# Patient Record
Sex: FEMALE | Race: WHITE | Employment: FULL TIME | URBAN - METROPOLITAN AREA
[De-identification: names, ages, dates, MRNs, and addresses within clinical notes are randomized per-mention and may not be internally consistent; named-entity substitution may affect disease eponyms.]

---

## 2017-11-20 ENCOUNTER — COMMUNICATION - HEALTHEAST (OUTPATIENT)
Dept: SCHEDULING | Facility: CLINIC | Age: 22
End: 2017-11-20

## 2017-11-20 DIAGNOSIS — F33.9 MAJOR DEPRESSIVE DISORDER, RECURRENT EPISODE (H): ICD-10-CM

## 2018-02-19 ENCOUNTER — OFFICE VISIT - HEALTHEAST (OUTPATIENT)
Dept: INTERNAL MEDICINE | Facility: CLINIC | Age: 23
End: 2018-02-19

## 2018-02-19 ENCOUNTER — COMMUNICATION - HEALTHEAST (OUTPATIENT)
Dept: INTERNAL MEDICINE | Facility: CLINIC | Age: 23
End: 2018-02-19

## 2018-02-19 DIAGNOSIS — J03.90 TONSILLITIS WITH EXUDATE: ICD-10-CM

## 2018-02-19 LAB
DEPRECATED S PYO AG THROAT QL EIA: NORMAL
MONOCYTES NFR BLD AUTO: POSITIVE %

## 2018-02-20 ENCOUNTER — COMMUNICATION - HEALTHEAST (OUTPATIENT)
Dept: INTERNAL MEDICINE | Facility: CLINIC | Age: 23
End: 2018-02-20

## 2018-02-20 LAB — GROUP A STREP BY PCR: NORMAL

## 2018-02-22 ENCOUNTER — COMMUNICATION - HEALTHEAST (OUTPATIENT)
Dept: INTERNAL MEDICINE | Facility: CLINIC | Age: 23
End: 2018-02-22

## 2018-02-28 ENCOUNTER — COMMUNICATION - HEALTHEAST (OUTPATIENT)
Dept: SCHEDULING | Facility: CLINIC | Age: 23
End: 2018-02-28

## 2018-02-28 ENCOUNTER — OFFICE VISIT - HEALTHEAST (OUTPATIENT)
Dept: INTERNAL MEDICINE | Facility: CLINIC | Age: 23
End: 2018-02-28

## 2018-02-28 DIAGNOSIS — B27.90 INFECTIOUS MONONUCLEOSIS: ICD-10-CM

## 2018-02-28 ASSESSMENT — MIFFLIN-ST. JEOR: SCORE: 1203.09

## 2018-05-24 ENCOUNTER — COMMUNICATION - HEALTHEAST (OUTPATIENT)
Dept: INTERNAL MEDICINE | Facility: CLINIC | Age: 23
End: 2018-05-24

## 2018-05-24 DIAGNOSIS — F33.9 MAJOR DEPRESSIVE DISORDER, RECURRENT EPISODE (H): ICD-10-CM

## 2018-06-02 ENCOUNTER — COMMUNICATION - HEALTHEAST (OUTPATIENT)
Dept: SCHEDULING | Facility: CLINIC | Age: 23
End: 2018-06-02

## 2018-06-05 ENCOUNTER — COMMUNICATION - HEALTHEAST (OUTPATIENT)
Dept: SCHEDULING | Facility: CLINIC | Age: 23
End: 2018-06-05

## 2018-06-15 ENCOUNTER — OFFICE VISIT - HEALTHEAST (OUTPATIENT)
Dept: INTERNAL MEDICINE | Facility: CLINIC | Age: 23
End: 2018-06-15

## 2018-06-15 DIAGNOSIS — A09 TRAVELER'S DIARRHEA: ICD-10-CM

## 2018-06-15 DIAGNOSIS — Z29.89 NEED FOR MALARIA PROPHYLAXIS: ICD-10-CM

## 2018-06-15 DIAGNOSIS — Z23: ICD-10-CM

## 2018-06-26 ENCOUNTER — COMMUNICATION - HEALTHEAST (OUTPATIENT)
Dept: INTERNAL MEDICINE | Facility: CLINIC | Age: 23
End: 2018-06-26

## 2018-07-15 ENCOUNTER — COMMUNICATION - HEALTHEAST (OUTPATIENT)
Dept: INTERNAL MEDICINE | Facility: CLINIC | Age: 23
End: 2018-07-15

## 2018-07-16 ENCOUNTER — OFFICE VISIT - HEALTHEAST (OUTPATIENT)
Dept: INTERNAL MEDICINE | Facility: CLINIC | Age: 23
End: 2018-07-16

## 2018-07-16 ENCOUNTER — COMMUNICATION - HEALTHEAST (OUTPATIENT)
Dept: SCHEDULING | Facility: CLINIC | Age: 23
End: 2018-07-16

## 2018-07-16 ENCOUNTER — AMBULATORY - HEALTHEAST (OUTPATIENT)
Dept: INTERNAL MEDICINE | Facility: CLINIC | Age: 23
End: 2018-07-16

## 2018-07-16 DIAGNOSIS — R35.0 URINE FREQUENCY: ICD-10-CM

## 2018-07-16 DIAGNOSIS — N39.0 FREQUENT UTI: ICD-10-CM

## 2018-07-16 LAB
ALBUMIN UR-MCNC: NEGATIVE MG/DL
APPEARANCE UR: CLEAR
BACTERIA #/AREA URNS HPF: ABNORMAL HPF
BILIRUB UR QL STRIP: NEGATIVE
COLOR UR AUTO: YELLOW
GLUCOSE UR STRIP-MCNC: NEGATIVE MG/DL
HGB UR QL STRIP: ABNORMAL
KETONES UR STRIP-MCNC: NEGATIVE MG/DL
LEUKOCYTE ESTERASE UR QL STRIP: ABNORMAL
NITRATE UR QL: NEGATIVE
PH UR STRIP: 6 [PH] (ref 5–8)
RBC #/AREA URNS AUTO: ABNORMAL HPF
SP GR UR STRIP: <=1.005 (ref 1–1.03)
SQUAMOUS #/AREA URNS AUTO: ABNORMAL LPF
UROBILINOGEN UR STRIP-ACNC: ABNORMAL
WBC #/AREA URNS AUTO: ABNORMAL HPF

## 2018-07-18 ENCOUNTER — COMMUNICATION - HEALTHEAST (OUTPATIENT)
Dept: INTERNAL MEDICINE | Facility: CLINIC | Age: 23
End: 2018-07-18

## 2018-07-18 DIAGNOSIS — N30.00 ACUTE CYSTITIS WITHOUT HEMATURIA: ICD-10-CM

## 2018-07-18 LAB — BACTERIA SPEC CULT: ABNORMAL

## 2018-07-26 ENCOUNTER — COMMUNICATION - HEALTHEAST (OUTPATIENT)
Dept: INTERNAL MEDICINE | Facility: CLINIC | Age: 23
End: 2018-07-26

## 2018-08-24 ENCOUNTER — COMMUNICATION - HEALTHEAST (OUTPATIENT)
Dept: INTERNAL MEDICINE | Facility: CLINIC | Age: 23
End: 2018-08-24

## 2018-08-24 ENCOUNTER — COMMUNICATION - HEALTHEAST (OUTPATIENT)
Dept: SCHEDULING | Facility: CLINIC | Age: 23
End: 2018-08-24

## 2018-08-24 DIAGNOSIS — N30.00 ACUTE CYSTITIS WITHOUT HEMATURIA: ICD-10-CM

## 2018-11-16 ENCOUNTER — COMMUNICATION - HEALTHEAST (OUTPATIENT)
Dept: SCHEDULING | Facility: CLINIC | Age: 23
End: 2018-11-16

## 2018-12-11 ENCOUNTER — COMMUNICATION - HEALTHEAST (OUTPATIENT)
Dept: INTERNAL MEDICINE | Facility: CLINIC | Age: 23
End: 2018-12-11

## 2018-12-11 ENCOUNTER — OFFICE VISIT - HEALTHEAST (OUTPATIENT)
Dept: INTERNAL MEDICINE | Facility: CLINIC | Age: 23
End: 2018-12-11

## 2018-12-11 DIAGNOSIS — N39.0 URINARY TRACT INFECTION WITHOUT HEMATURIA, SITE UNSPECIFIED: ICD-10-CM

## 2018-12-11 LAB
ALBUMIN UR-MCNC: NEGATIVE MG/DL
APPEARANCE UR: CLEAR
BACTERIA #/AREA URNS HPF: ABNORMAL HPF
BILIRUB UR QL STRIP: NEGATIVE
COLOR UR AUTO: YELLOW
GLUCOSE UR STRIP-MCNC: NEGATIVE MG/DL
HGB UR QL STRIP: ABNORMAL
KETONES UR STRIP-MCNC: NEGATIVE MG/DL
LEUKOCYTE ESTERASE UR QL STRIP: ABNORMAL
NITRATE UR QL: NEGATIVE
PH UR STRIP: 6 [PH] (ref 5–8)
RBC #/AREA URNS AUTO: ABNORMAL HPF
SP GR UR STRIP: 1.01 (ref 1–1.03)
SQUAMOUS #/AREA URNS AUTO: ABNORMAL LPF
UROBILINOGEN UR STRIP-ACNC: ABNORMAL
WBC #/AREA URNS AUTO: ABNORMAL HPF

## 2018-12-12 ENCOUNTER — COMMUNICATION - HEALTHEAST (OUTPATIENT)
Dept: INTERNAL MEDICINE | Facility: CLINIC | Age: 23
End: 2018-12-12

## 2018-12-12 DIAGNOSIS — N39.0 RECURRENT UTI: ICD-10-CM

## 2018-12-13 ENCOUNTER — COMMUNICATION - HEALTHEAST (OUTPATIENT)
Dept: INTERNAL MEDICINE | Facility: CLINIC | Age: 23
End: 2018-12-13

## 2018-12-13 ENCOUNTER — AMBULATORY - HEALTHEAST (OUTPATIENT)
Dept: INTERNAL MEDICINE | Facility: CLINIC | Age: 23
End: 2018-12-13

## 2018-12-13 DIAGNOSIS — N39.0 URINARY TRACT INFECTION: ICD-10-CM

## 2018-12-13 LAB — BACTERIA SPEC CULT: ABNORMAL

## 2018-12-19 ENCOUNTER — OFFICE VISIT (OUTPATIENT)
Dept: UROLOGY | Facility: CLINIC | Age: 23
End: 2018-12-19
Attending: OBSTETRICS & GYNECOLOGY
Payer: COMMERCIAL

## 2018-12-19 VITALS
WEIGHT: 111 LBS | HEART RATE: 66 BPM | SYSTOLIC BLOOD PRESSURE: 113 MMHG | HEIGHT: 63 IN | DIASTOLIC BLOOD PRESSURE: 74 MMHG | BODY MASS INDEX: 19.67 KG/M2

## 2018-12-19 DIAGNOSIS — N30.00 ACUTE CYSTITIS WITHOUT HEMATURIA: Primary | ICD-10-CM

## 2018-12-19 DIAGNOSIS — N89.8 VAGINAL DISCHARGE: ICD-10-CM

## 2018-12-19 DIAGNOSIS — N39.0 RECURRENT UTI: ICD-10-CM

## 2018-12-19 LAB
CLUE CELLS: NORMAL
TRICHOMONAS (WET PREP): NORMAL
YEAST (WET PREP): NORMAL

## 2018-12-19 PROCEDURE — 87210 SMEAR WET MOUNT SALINE/INK: CPT | Mod: ZF | Performed by: OBSTETRICS & GYNECOLOGY

## 2018-12-19 PROCEDURE — G0463 HOSPITAL OUTPT CLINIC VISIT: HCPCS | Mod: ZF

## 2018-12-19 RX ORDER — CEPHALEXIN 500 MG/1
500 CAPSULE ORAL PRN
COMMUNITY
Start: 2018-12-14 | End: 2019-04-03

## 2018-12-19 RX ORDER — CIPROFLOXACIN 500 MG/1
500 TABLET, FILM COATED ORAL 2 TIMES DAILY
COMMUNITY
Start: 2018-12-13 | End: 2019-04-03

## 2018-12-19 RX ORDER — NORGESTIMATE AND ETHINYL ESTRADIOL 0.25-0.035
1 KIT ORAL DAILY
COMMUNITY
Start: 2018-11-09 | End: 2023-08-09

## 2018-12-19 RX ORDER — TRETINOIN 1 MG/G
1 CREAM TOPICAL PRN
COMMUNITY

## 2018-12-19 RX ORDER — NITROFURANTOIN 25; 75 MG/1; MG/1
100 CAPSULE ORAL DAILY
Qty: 90 CAPSULE | Refills: 0 | Status: SHIPPED | OUTPATIENT
Start: 2018-12-19 | End: 2019-04-03

## 2018-12-19 SDOH — HEALTH STABILITY: MENTAL HEALTH: HOW MANY STANDARD DRINKS CONTAINING ALCOHOL DO YOU HAVE ON A TYPICAL DAY?: 1 OR 2

## 2018-12-19 SDOH — HEALTH STABILITY: MENTAL HEALTH: HOW OFTEN DO YOU HAVE 6 OR MORE DRINKS ON ONE OCCASION?: NEVER

## 2018-12-19 SDOH — HEALTH STABILITY: MENTAL HEALTH: HOW OFTEN DO YOU HAVE A DRINK CONTAINING ALCOHOL?: 2-3 TIMES A WEEK

## 2018-12-19 ASSESSMENT — PAIN SCALES - GENERAL: PAINLEVEL: NO PAIN (0)

## 2018-12-19 ASSESSMENT — MIFFLIN-ST. JEOR: SCORE: 1209.68

## 2018-12-19 NOTE — PROGRESS NOTES
December 19, 2018    Referring Provider: Referred Self, MD  No address on file    Primary Care Provider: No primary care provider on file.    CC: Recurrent UTIs    HPI:  Nereyda Carr is a 23 year old female who presents for evaluation of her pelvic floor symptoms.  She has at least 3 culture proven UTIs since July of this year. In the past she would get 1-2 UTIs/year since initiating intercourse. Starting in July she would get a UTI every time she had intercourse with her new partner. Denies anal intercourse. They both try to clean before and after intercourse. Does use a vibrator, but is careful to clean that as well. Practices good hygiene after voiding. W/U to date includes a CT abd/pelvis in July that was normal without any evidence of renal anomalies. Has not had cystoscopy to date. Is currently on course of Cipro that will end tomorrow for her most current UTI. Was given Keflex to take as prophylaxis after intercourse.    12/11/18  Culture ESCHERICHIA COLI (A)   [facility]     Culture, Urine (12/11/2018 2:08 PM CST)   Specimen   Urine     Culture, Urine (12/11/2018 2:08 PM CST)   Organism Antibiotic Method Susceptibility   Escherichia coli Amoxicillin + Clavulanate PARAMJIT 16/8: Intermediate     Escherichia coli Ampicillin PARAMJIT >16: Resistant     Escherichia coli Cefazolin PARAMJIT >16: Resistant     Escherichia coli Cefepime PARAMJIT <=1: Sensitive     Escherichia coli Ceftriaxone PARAMJIT <=1: Sensitive     Escherichia coli Ciprofloxacin PARAMJIT <=0.5: Sensitive     Escherichia coli Gentamicin PARAMJIT <=2: Sensitive     Escherichia coli Levofloxacin PARAMJIT <=1: Sensitive     Escherichia coli Meropenem PARAMJIT <=0.25: Sensitive     Escherichia coli Nitrofurantoin PARAMJIT <=16: Sensitive     Escherichia coli Tetracycline PARAMJIT <=2: Sensitive     Escherichia coli Tobramycin PARAMJIT <=2: Sensitive     Escherichia coli Trimethoprim + Sulfamethoxazole PARAMJIT >2/38: Resistant       11/19/18  Urine Cuture > 100,000 col/ml Escherichia coliAbnormal    Urine Culture Escherichia coliAbnormal   Organism Comment > 100,000 col/mlAbnormal   Resulting Agency PN SOFT     Susceptibility     Escherichia coli   PARAMJIT SENSITIVITY   Amikacin <=2 mcg/mL Sensitive   Ampicillin/Sulbactam 16 mcg/mL Intermediate   Cefazolin <=4 mcg/mL Sensitive1   Ciprofloxacin >=4 mcg/mL Resistant   Ertapenem <=0.5 mcg/mL Sensitive   Imipenem <=0.25 mcg/mL Sensitive   Levofloxacin >=8 mcg/mL Resistant   Meropenem <=0.25 mcg/mL Sensitive   Nitrofurantoin <=16 mcg/mL Sensitive2   PIPERACILLIN/TAZOBACTAM <=4 mcg/mL Sensitive   Tobramycin 2 mcg/mL Sensitive   Trimethoprim/Sulfamethoxazole >=320 mcg/mL Resistant     Culture, Urine (09/16/2018 7:27 AM CDT)  Culture, Urine (09/16/2018 7:27 AM CDT)   Component Value Ref Range Performed At   Culture ESCHERICHIA COLI (A)   [facility]   Culture GROUP B STREPTOCOCCUS (A)   [facility]     Culture, Urine (09/16/2018 7:27 AM CDT)   Specimen   Urine     Culture, Urine (09/16/2018 7:27 AM CDT)   Organism Antibiotic Method Susceptibility   Escherichia coli Amoxicillin + Clavulanate PARAMJIT <=4/2: Sensitive     Escherichia coli Ampicillin PARAMJIT <=4: Sensitive     Escherichia coli Cefazolin PARAMJIT 2: Sensitive     Escherichia coli Cefepime PARAMJIT <=1: Sensitive     Escherichia coli Ceftriaxone PARAMJIT <=1: Sensitive     Escherichia coli Ciprofloxacin PARAMJIT <=0.5: Sensitive     Escherichia coli Gentamicin PARAMJIT <=2: Sensitive     Escherichia coli Levofloxacin PARAMJIT <=1: Sensitive     Escherichia coli Meropenem PARAMJIT <=0.25: Sensitive     Escherichia coli Nitrofurantoin PARAMJIT <=16: Sensitive     Escherichia coli Tetracycline PARAMJIT <=2: Sensitive     Escherichia coli Tobramycin PARAMJIT <=2: Sensitive     Escherichia coli Trimethoprim + Sulfamethoxazole PARAMJIT <=0.5: Sensitive       Culture, Urine (07/16/2018 9:14 AM CDT)  Culture, Urine (07/16/2018 9:14 AM CDT)   Component Value Ref Range Performed At   Culture ESCHERICHIA COLI (A)   [facility]     Culture, Urine  (07/16/2018 9:14 AM CDT)   Specimen   Urine     Culture, Urine (07/16/2018 9:14 AM CDT)   Organism Antibiotic Method Susceptibility   Escherichia coli Amoxicillin + Clavulanate PARAMJIT <=4/2: Sensitive     Escherichia coli Ampicillin PARAMJIT <=4: Sensitive     Escherichia coli Cefazolin PARAMJIT <=1: Sensitive     Escherichia coli Cefepime PARAMJIT <=1: Sensitive     Escherichia coli Ceftriaxone PARAMJIT <=1: Sensitive     Escherichia coli Ciprofloxacin PARAMJIT >2: Resistant     Escherichia coli Gentamicin PARAMJIT <=2: Sensitive     Escherichia coli Levofloxacin PARAMJIT >4: Resistant     Escherichia coli Meropenem PARAMJIT <=0.25: Sensitive     Escherichia coli Nitrofurantoin PARAMJIT <=16: Sensitive     Escherichia coli Tetracycline PARAMJIT >8: Resistant     Escherichia coli Tobramycin PARAMJIT <=2: Sensitive     Escherichia coli Trimethoprim + Sulfamethoxazole PARAMJIT >2/38: Resistant         EXAM DATE:         07/26/2018    [facility]  CT ABDOMEN, PELVIS W/O and WITH FOR HEMATURIA  7/26/2018 8:00 AM    INDICATION: FREQUENCY OF MICTURITION URINARY TRACT INFECTION, SITE NOT SPECIFIED  TECHNIQUE: CT abdomen without contrast and CT abdomen and pelvis with IV  contrast. CT urogram biphasic protocol with a pyelonephrographic phase.  Multiplanar reformation images (MPR). Dose reduction techniques were used.  IV CONTRAST: 100 mL [id number] was administered from a single use vial.  COMPARISON: None.    FINDINGS:  LUNG BASES: Negative.    ABDOMEN:    Kidneys are symmetric in size, position, and axis. Symmetric renal enhancement.  No renal calculi. No hydronephrosis. No filling defects in the visualized  intrarenal collecting systems or ureters. The distal half of the left ureter and  the proximal third of the right ureter are not opacified.    Liver and spleen are unremarkable. Gallbladder, pancreas, and adrenal glands are  within normal limits.    Stomach and small bowel are unremarkable. No obstruction. Moderate stool burden  in the colon.    No bulky  abdominopelvic lymphadenopathy. There is trace fluid in the cul-de-sac.  No free air.    No abdominal aortic aneurysm. Early branching of the right renal artery. Small  accessory left renal artery to the lower pole.    PELVIS: There is a right ureteral jet. Urinary bladder grossly unremarkable.  Uterus and ovaries grossly within normal limits by CT.    MUSCULOSKELETAL: Small bone island in the right ilium.    CONCLUSION:  1.  Negative for acute pyelonephritis.    2.  No urothelial abnormalities in the visualized upper tracts.    3.  Negative for renal calculi or hydronephrosis    Prolapse:  Do you feel a vaginal bulge? no                                      Pressure? no   Do you have to place your fingers in the vagina or in the rectum to have a bowel movement? no  Impact to quality of life? NA     Stress Incontinence:  Do you leak urine with cough, sneeze, exercise? yes  How often do you leak with cough, sneeze, exercise?  occ  How much do you usually leak? drop   Do you wear a pad? no If so; NA  Impact to quality of life? minimal    Urge Incontinence:  Do you often get sudden urges to urinate? no  How often do have urges? NA  If so, do you leak with these urges? NA  How much do you usually leak? NA  Impact to quality of life? NA    Voids/day:4  Nocturia: 0  Fluid intake: 6  Caffeine: 1    Urinating:  Difficulty starting urination or strain to void? no  Weak or intermittent stream? no  Incomplete emptying or dribbling? no  Pain or burning with urination? no  Any blood in your urine? no    GI:  Constipation? yes  Frequency stools 4-5/week    Straining for stools yes  Stool consistency hard     Ever leak stool (Accidental Bowel Leakage)? no      If so, how often?               NA      If so, do you leak?                   NA      Soiling without sensation? no  History of irritable bowel or Crohn's? no    Sexual/Pain:  Are you currently having sex?. yes  Pain with sex?   no   Sexual Partner: male  Do any of these  symptoms interfere with sex? no  Impact to quality of life? NA    Prior therapy:  Ever done pelvic floor physical therapy? no  Trial of medication? no  Have you ever tried a pessary? no    Medical History:  Do you have?   High Cholesterol? no     Diabetes? no  High Blood pressure? no     Recurrent UTIs? yes  Sleep Apnea? no  Other medical problems: acne    Surgical History:    Hysterectomy? no   Bladder Surgery? no   Other? no     OB/Gyn History:  Pregnancies? 0  Deliveries? 0  Vaginal 0  Section 0  Current birth control? OCPs  Periods? regular  When was the first day of your last period? 16  Last Pap smear? UTD Any abnormal? no  Last mammogram? NA  Last colonoscopy? NA    Medications/Vitamins/Supplements: reviewed    Drug Allergies: c/o flu-like symptoms if takes macrodantin. Only time she took macrodantin was when she had episode of pyleonephritis    Latex Allergy: no  Iodine Allergy no    Family History: (list relationship and age at diagnosis)  Breast cancer? no   Ovarian cancer? no   Colon cancer? no  Other? no    Social History:  Marital status: single  Do you/ have you ever smoke(d)  cigarettes? no  Drink more than 1 alcoholic beverage a day?  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 no                   Unusual headaches? no  Leg pain w/ walking/exercise? no                       Easy bruising? no  Difficulty breathing w/ walking/exercise? no  Problems with vision? no  Dizziness, falls, or fainting? no  Excessive bleeding from cuts, gums, surgery? no  Other: no    No past medical history on file.    No past surgical history on file.    Social History     Socioeconomic History     Marital status: Single     Spouse name: Not on file     Number of children: Not on file     Years of education: Not on file     Highest education level: Not on file   Social Needs     Financial resource strain: Not on file     Food  insecurity - worry: Not on file     Food insecurity - inability: Not on file     Transportation needs - medical: Not on file     Transportation needs - non-medical: Not on file   Occupational History     Not on file   Tobacco Use     Smoking status: Not on file   Substance and Sexual Activity     Alcohol use: Not on file     Drug use: Not on file     Sexual activity: Not on file   Other Topics Concern     Not on file   Social History Narrative     Not on file       No family history on file.    ROS    Allergies not on file    No current outpatient medications on file.     No current facility-administered medications for this visit.        There were no vitals taken for this visit. No LMP recorded. There is no height or weight on file to calculate BMI.  Ms. Carr is alert, comfortable in no acute distress, non-labored breathing.   Abdomen is soft, non-tender, non-distended, no CVAT.    Normal external female genitalia. The urethra was normal appearing w/o masses, NT.    She has good support on supine strain.  Speculum and bimanual exam are remarkable for normal appearing vagina with moderate amount whitish discharge Uterus and adnexa without masses, NT.      3/5 kegels.      A/P: Nereyda Carr is a 23 year old F with recurrent E coli UTIs    W/U to date is negative. UTIs seem to be associated with intercourse. Will schedule cystoscopy to r/o possible bladder anomaly. Dicussed options of prophylaxis after intercourse versus continuous prophylaxis with macrodantin. Doubt that she has true allergy to macrodantin as her symptoms were probably secondary to her kidney infection. After discussion, shew would like to try continuous prophylaxis for 3-6 months. F/U in 3 months or sooner if breaks through antibiotics.     A total of 60 minutes were spent with the patient today, > 50% in counseling and coordination of care    Marcello Morse MD  Professor, OB/GYN  Urogynecologist  CC  No care team member to display  SELF,  REFERRED

## 2018-12-19 NOTE — LETTER
Date:December 20, 2018      Patient was self referred, no letter generated. Do not send.        [facility] Information

## 2018-12-19 NOTE — PATIENT INSTRUCTIONS
Schedule cystoscopy.  Macrobid 1 po every day #90 X1 RF  F/U in 3 months or sooner if gets UTI while on prophylaxis

## 2018-12-19 NOTE — LETTER
12/19/2018       RE: Nereyda Carr  [address] [facility] [address]     Dear Colleague,    Thank you for referring your patient, Nereyda Carr, to the [facility] at [facility]. Please see a copy of my visit note below.    December 19, 2018    Referring Provider: Referred Self, MD  No address on file    Primary Care Provider: No primary care provider on file.    CC: Recurrent UTIs    HPI:  Nereyda Carr is a 23 year old female who presents for evaluation of her pelvic floor symptoms.  She has at least 3 culture proven UTIs since July of this year. In the past she would get 1-2 UTIs/year since initiating intercourse. Starting in July she would get a UTI every time she had intercourse with her new partner. Denies anal intercourse. They both try to clean before and after intercourse. Does use a vibrator, but is careful to clean that as well. Practices good hygiene after voiding. W/U to date includes a CT abd/pelvis in July that was normal without any evidence of renal anomalies. Has not had cystoscopy to date. Is currently on course of Cipro that will end tomorrow for her most current UTI. Was given Keflex to take as prophylaxis after intercourse.    12/11/18  Culture ESCHERICHIA COLI (A)   [facility] LABORATORY     Culture, Urine (12/11/2018 2:08 PM CST)   Specimen   Urine     Culture, Urine (12/11/2018 2:08 PM CST)   Organism Antibiotic Method Susceptibility   Escherichia coli Amoxicillin + Clavulanate PARAMJIT 16/8: Intermediate     Escherichia coli Ampicillin PARAMJIT >16: Resistant     Escherichia coli Cefazolin PARAMJIT >16: Resistant     Escherichia coli Cefepime PARAMJIT <=1: Sensitive     Escherichia coli Ceftriaxone PARAMJIT <=1: Sensitive     Escherichia coli Ciprofloxacin PARAMJIT <=0.5: Sensitive     Escherichia coli Gentamicin PARAMJIT <=2: Sensitive     Escherichia coli Levofloxacin PARAMJIT <=1: Sensitive     Escherichia coli Meropenem PARAMJIT <=0.25: Sensitive     Escherichia  coli Nitrofurantoin PARAMJIT <=16: Sensitive     Escherichia coli Tetracycline PARAMJIT <=2: Sensitive     Escherichia coli Tobramycin PARAMJIT <=2: Sensitive     Escherichia coli Trimethoprim + Sulfamethoxazole PARAMJIT >2/38: Resistant       11/19/18  Urine Cuture > 100,000 col/ml Escherichia coliAbnormal   Urine Culture Escherichia coliAbnormal   Organism Comment > 100,000 col/mlAbnormal   Resulting Agency PN SOFT     Susceptibility     Escherichia coli   PARAMJIT SENSITIVITY   Amikacin <=2 mcg/mL Sensitive   Ampicillin/Sulbactam 16 mcg/mL Intermediate   Cefazolin <=4 mcg/mL Sensitive1   Ciprofloxacin >=4 mcg/mL Resistant   Ertapenem <=0.5 mcg/mL Sensitive   Imipenem <=0.25 mcg/mL Sensitive   Levofloxacin >=8 mcg/mL Resistant   Meropenem <=0.25 mcg/mL Sensitive   Nitrofurantoin <=16 mcg/mL Sensitive2   PIPERACILLIN/TAZOBACTAM <=4 mcg/mL Sensitive   Tobramycin 2 mcg/mL Sensitive   Trimethoprim/Sulfamethoxazole >=320 mcg/mL Resistant     Culture, Urine (09/16/2018 7:27 AM CDT)  Culture, Urine (09/16/2018 7:27 AM CDT)   Component Value Ref Range Performed At   Culture ESCHERICHIA COLI (A)   [facility] LABORATORY   Culture GROUP B STREPTOCOCCUS (A)   [facility] LABORATORY     Culture, Urine (09/16/2018 7:27 AM CDT)   Specimen   Urine     Culture, Urine (09/16/2018 7:27 AM CDT)   Organism Antibiotic Method Susceptibility   Escherichia coli Amoxicillin + Clavulanate PARAMJIT <=4/2: Sensitive     Escherichia coli Ampicillin PARAMJIT <=4: Sensitive     Escherichia coli Cefazolin PARAMJIT 2: Sensitive     Escherichia coli Cefepime PARAMJIT <=1: Sensitive     Escherichia coli Ceftriaxone PARAMJIT <=1: Sensitive     Escherichia coli Ciprofloxacin PARAMJIT <=0.5: Sensitive     Escherichia coli Gentamicin PARAMJIT <=2: Sensitive     Escherichia coli Levofloxacin PARAMJIT <=1: Sensitive     Escherichia coli Meropenem PARAMJIT <=0.25: Sensitive     Escherichia coli Nitrofurantoin PARAMJIT <=16: Sensitive     Escherichia coli Tetracycline PARAMJIT <=2: Sensitive     Escherichia coli Tobramycin PARAMJIT <=2:  Sensitive     Escherichia coli Trimethoprim + Sulfamethoxazole PARAMJIT <=0.5: Sensitive       Culture, Urine (07/16/2018 9:14 AM CDT)  Culture, Urine (07/16/2018 9:14 AM CDT)   Component Value Ref Range Performed At   Culture ESCHERICHIA COLI (A)   [facility] LABORATORY     Culture, Urine (07/16/2018 9:14 AM CDT)   Specimen   Urine     Culture, Urine (07/16/2018 9:14 AM CDT)   Organism Antibiotic Method Susceptibility   Escherichia coli Amoxicillin + Clavulanate PARAMJIT <=4/2: Sensitive     Escherichia coli Ampicillin PARAMJIT <=4: Sensitive     Escherichia coli Cefazolin PARAMJIT <=1: Sensitive     Escherichia coli Cefepime PARAMJIT <=1: Sensitive     Escherichia coli Ceftriaxone PARAMJIT <=1: Sensitive     Escherichia coli Ciprofloxacin PARAMJIT >2: Resistant     Escherichia coli Gentamicin PARAMJIT <=2: Sensitive     Escherichia coli Levofloxacin PARAMJIT >4: Resistant     Escherichia coli Meropenem PARAMJIT <=0.25: Sensitive     Escherichia coli Nitrofurantoin PARAMJIT <=16: Sensitive     Escherichia coli Tetracycline PARAMJIT >8: Resistant     Escherichia coli Tobramycin PARAMJIT <=2: Sensitive     Escherichia coli Trimethoprim + Sulfamethoxazole PARAMJIT >2/38: Resistant         EXAM DATE:         07/26/2018    [facility]  CT ABDOMEN, PELVIS W/O and WITH FOR HEMATURIA  7/26/2018 8:00 AM    INDICATION: FREQUENCY OF MICTURITION URINARY TRACT INFECTION, SITE NOT SPECIFIED  TECHNIQUE: CT abdomen without contrast and CT abdomen and pelvis with IV  contrast. CT urogram biphasic protocol with a pyelonephrographic phase.  Multiplanar reformation images (MPR). Dose reduction techniques were used.  IV CONTRAST: 100 mL [id number] was administered from a single use vial.  COMPARISON: None.    FINDINGS:  LUNG BASES: Negative.    ABDOMEN:    Kidneys are symmetric in size, position, and axis. Symmetric renal enhancement.  No renal calculi. No hydronephrosis. No filling defects in the visualized  intrarenal collecting systems or ureters. The distal half of the left ureter  and  the proximal third of the right ureter are not opacified.    Liver and spleen are unremarkable. Gallbladder, pancreas, and adrenal glands are  within normal limits.    Stomach and small bowel are unremarkable. No obstruction. Moderate stool burden  in the colon.    No bulky abdominopelvic lymphadenopathy. There is trace fluid in the cul-de-sac.  No free air.    No abdominal aortic aneurysm. Early branching of the right renal artery. Small  accessory left renal artery to the lower pole.    PELVIS: There is a right ureteral jet. Urinary bladder grossly unremarkable.  Uterus and ovaries grossly within normal limits by CT.    MUSCULOSKELETAL: Small bone island in the right ilium.    CONCLUSION:  1.  Negative for acute pyelonephritis.    2.  No urothelial abnormalities in the visualized upper tracts.    3.  Negative for renal calculi or hydronephrosis    Prolapse:  Do you feel a vaginal bulge? no                                      Pressure? no   Do you have to place your fingers in the vagina or in the rectum to have a bowel movement? no  Impact to quality of life? NA     Stress Incontinence:  Do you leak urine with cough, sneeze, exercise? yes  How often do you leak with cough, sneeze, exercise?  occ  How much do you usually leak? drop   Do you wear a pad? no If so; NA  Impact to quality of life? minimal    Urge Incontinence:  Do you often get sudden urges to urinate? no  How often do have urges? NA  If so, do you leak with these urges? NA  How much do you usually leak? NA  Impact to quality of life? NA    Voids/day:4  Nocturia: 0  Fluid intake: 6  Caffeine: 1    Urinating:  Difficulty starting urination or strain to void? no  Weak or intermittent stream? no  Incomplete emptying or dribbling? no  Pain or burning with urination? no  Any blood in your urine? no    GI:  Constipation? yes  Frequency stools 4-5/week    Straining for stools yes  Stool consistency hard     Ever leak stool (Accidental Bowel Leakage)?  no      If so, how often?               NA      If so, do you leak?                   NA      Soiling without sensation? no  History of irritable bowel or Crohn's? no    Sexual/Pain:  Are you currently having sex?. yes  Pain with sex?   no   Sexual Partner: male  Do any of these symptoms interfere with sex? no  Impact to quality of life? NA    Prior therapy:  Ever done pelvic floor physical therapy? no  Trial of medication? no  Have you ever tried a pessary? no    Medical History:  Do you have?   High Cholesterol? no     Diabetes? no  High Blood pressure? no     Recurrent UTIs? yes  Sleep Apnea? no  Other medical problems: acne    Surgical History:    Hysterectomy? no   Bladder Surgery? no   Other? no     OB/Gyn History:  Pregnancies? 0  Deliveries? 0  Vaginal 0  Section 0  Current birth control? OCPs  Periods? regular  When was the first day of your last period? 18  Last Pap smear? UTD Any abnormal? no  Last mammogram? NA  Last colonoscopy? NA    Medications/Vitamins/Supplements: reviewed    Drug Allergies: c/o flu-like symptoms if takes macrodantin. Only time she took macrodantin was when she had episode of pyleonephritis    Latex Allergy: no  Iodine Allergy no    Family History: (list relationship and age at diagnosis)  Breast cancer? no   Ovarian cancer? no   Colon cancer? no  Other? no    Social History:  Marital status: single  Do you/ have you ever smoke(d)  cigarettes? no  Drink more than 1 alcoholic beverage a day?  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 no                   Unusual headaches? no  Leg pain w/ walking/exercise? no                       Easy bruising? no  Difficulty breathing w/ walking/exercise? no  Problems with vision? no  Dizziness, falls, or fainting? no  Excessive bleeding from cuts, gums, surgery? no  Other: no    No past medical history on file.    No past surgical history on  file.    Social History     Socioeconomic History     Marital status: Single     Spouse name: Not on file     Number of children: Not on file     Years of education: Not on file     Highest education level: Not on file   Social Needs     Financial resource strain: Not on file     Food insecurity - worry: Not on file     Food insecurity - inability: Not on file     Transportation needs - medical: Not on file     Transportation needs - non-medical: Not on file   Occupational History     Not on file   Tobacco Use     Smoking status: Not on file   Substance and Sexual Activity     Alcohol use: Not on file     Drug use: Not on file     Sexual activity: Not on file   Other Topics Concern     Not on file   Social History Narrative     Not on file       No family history on file.    ROS    Allergies not on file    No current outpatient medications on file.     No current facility-administered medications for this visit.        There were no vitals taken for this visit. No LMP recorded. There is no height or weight on file to calculate BMI.  Ms. Carr is alert, comfortable in no acute distress, non-labored breathing.   Abdomen is soft, non-tender, non-distended, no CVAT.    Normal external female genitalia. The urethra was normal appearing w/o masses, NT.    She has good support on supine strain.  Speculum and bimanual exam are remarkable for normal appearing vagina with moderate amount whitish discharge Uterus and adnexa without masses, NT.      3/5 kegels.      A/P: Nereyda Carr is a 23 year old F with recurrent E coli UTIs    W/U to date is negative. UTIs seem to be associated with intercourse. Will schedule cystoscopy to r/o possible bladder anomaly. Dicussed options of prophylaxis after intercourse versus continuous prophylaxis with macrodantin. Doubt that she has true allergy to macrodantin as her symptoms were probably secondary to her kidney infection. After discussion, shew would like to try continuous prophylaxis  for 3-6 months. F/U in 3 months or sooner if breaks through antibiotics.     A total of 60 minutes were spent with the patient today, > 50% in counseling and coordination of care    Marcello Morse MD  Professor, OB/GYN  Urogynecologist  CC  No care team member to display  SELF, REFERRED                Again, thank you for allowing me to participate in the care of your patient.      Sincerely,    Marcello Morse MD

## 2018-12-20 ENCOUNTER — TELEPHONE (OUTPATIENT)
Dept: OBGYN | Facility: CLINIC | Age: 23
End: 2018-12-20

## 2018-12-21 ENCOUNTER — OFFICE VISIT (OUTPATIENT)
Dept: UROLOGY | Facility: CLINIC | Age: 23
End: 2018-12-21
Payer: COMMERCIAL

## 2018-12-21 VITALS — DIASTOLIC BLOOD PRESSURE: 75 MMHG | HEART RATE: 74 BPM | SYSTOLIC BLOOD PRESSURE: 112 MMHG

## 2018-12-21 DIAGNOSIS — N39.0 RECURRENT UTI: Primary | ICD-10-CM

## 2018-12-21 ASSESSMENT — PAIN SCALES - GENERAL: PAINLEVEL: NO PAIN (0)

## 2018-12-21 NOTE — NURSING NOTE
Invasive Procedure Safety Checklist:    Procedure:     Action: Complete sections and checkboxes as appropriate.    Pre-procedure:  1. Patient ID Verified with 2 identifiers ([employer] and  or MRN) : YES    2. Procedure and site verified with patient/designee (when able) : YES    3. Accurate consent documentation in medical record : YES    4. H&P (or appropriate assessment) documented in medical record : YES  H&P must be up to 30 days prior to procedure an updated within 24 hours of                 Procedure as applicable.     5. Relevant diagnostic and radiology test results appropriately labeled and displayed as applicable : YES    6. Blood products, implants, devices, and/or special equipment available for the procedure as applicable : YES    7. Procedure site(s) marked with provider initials [Exclusions: ] : YES    8. Marking not required. Reason : Yes  Procedure does not require site marking    Time Out:     Time-Out performed immediately prior to starting procedure, including verbal and active participation of all team members addressing: YES    1. Correct patient identity.  2. Confirmed that the correct side and site are marked.  3. An accurate procedure to be done.  4. Agreement on the procedure to be done.  5. Correct patient position.  6. Relevant images and results are properly labeled and appropriately displayed.  7. The need to administer antibiotics or fluids for irrigation purposes during the procedure as applicable.  8. Safety precautions based on patient history or medication use.    During Procedure: Verification of correct person, site, and procedure occurs any time the responsibility for care of the patient is transferred to another member of the care team.        Lidocaine jell inserted and patient tolerated well.

## 2018-12-21 NOTE — LETTER
Date:December 24, 2018      Patient was self referred, no letter generated. Do not send.        [facility] Health Information

## 2018-12-21 NOTE — PROGRESS NOTES
Reason for Visit:  Cystoscopy    Clinical Data: Ms. Nereyda Carr is a 23 year old female with a hx of recurrent UTIs    Cystoscopy procedure:  Pt. Was consented and placed in the lithotomy position.  She was cleaned and preparred in the usual fashion.  Lidocain gel was inserted into the urethra and given time to take effect.  A 16 fr flexible cystoscope was then inserted through the urethra and into the bladder.  The urethra was wnl.  The bladder was without trabeculation.  No tumors, diverticulae, or stones.  Bilateral u/o's were effluxing clear urine.  The cystoscope was then withdrawn.  The pt. Tolerated the procedure well.    A/P:  23 year old female with recurrent UTIs and normal cystoscopy    Start macrobid as rx'd. Delay intercourse 3 days.    Thank you for allowing me to participate in the care of  Ms. Nereyda Carr and I will keep you updated on her progress.    Marcello Morse

## 2018-12-21 NOTE — LETTER
12/21/2018       RE: Nereyda Carr  [address] [facility] [address]     Dear Colleague,    Thank you for referring your patient, Nereyda Carr, to the [facility] UROLOGY AND INST FOR PROSTATE AND UROLOGIC CANCERS at [facility] Please see a copy of my visit note below.    Reason for Visit:  Cystoscopy    Clinical Data: Ms. Nereyda Carr is a 23 year old female with a hx of recurrent UTIs    Cystoscopy procedure:  Pt. Was consented and placed in the lithotomy position.  She was cleaned and preparred in the usual fashion.  Lidocain gel was inserted into the urethra and given time to take effect.  A 16 fr flexible cystoscope was then inserted through the urethra and into the bladder.  The urethra was wnl.  The bladder was without trabeculation.  No tumors, diverticulae, or stones.  Bilateral u/o's were effluxing clear urine.  The cystoscope was then withdrawn.  The pt. Tolerated the procedure well.    A/P:  23 year old female with recurrent UTIs and normal cystoscopy    Start macrobid as rx'd. Delay intercourse 3 days.    Thank you for allowing me to participate in the care of  Ms. Nereyda Carr and I will keep you updated on her progress.    Marcello Morse        Again, thank you for allowing me to participate in the care of your patient.      Sincerely,    Marcello Morse MD

## 2019-02-04 ENCOUNTER — TELEPHONE (OUTPATIENT)
Dept: OBGYN | Facility: CLINIC | Age: 24
End: 2019-02-04

## 2019-02-04 NOTE — TELEPHONE ENCOUNTER
Received call from Nereyda. She is wanting to pass a question to Dr. Morse regarding magnesium supplementation to help with constipation with concurrent antibiotic use for bladder infection.    Routed this message to Dr. Morse.

## 2019-02-15 ENCOUNTER — HEALTH MAINTENANCE LETTER (OUTPATIENT)
Age: 24
End: 2019-02-15

## 2019-03-19 ENCOUNTER — TELEPHONE (OUTPATIENT)
Dept: OBGYN | Facility: CLINIC | Age: 24
End: 2019-03-19

## 2019-03-19 NOTE — TELEPHONE ENCOUNTER
Nereyda to see Dr Morse after finishing antibiotics, she will finish tomorrow and right now has an appt to see him the following day 3/21/19. She is questioning if she should wait  Longer for f/u and what time frame that is.    I left a vm that I will check with him and have him mychart her back.

## 2019-03-19 NOTE — TELEPHONE ENCOUNTER
----- Message from Paresh Miller sent at 3/19/2019  9:33 AM CDT -----  Regarding: PT has a question about her appt. with Dr. Morse  Contact: [phone]  PT states she was supposed to schedule a follow up with Dr. Morse after she finished her antibiotics.  She will be taking her last one tomorrow and has an appointment with Dr. Morse on 3/21/19.  She is wondering if that is too soon to see him.  Please follow up with the PT.     Thank you,  Paresh    Call Center

## 2019-03-28 ENCOUNTER — COMMUNICATION - HEALTHEAST (OUTPATIENT)
Dept: INTERNAL MEDICINE | Facility: CLINIC | Age: 24
End: 2019-03-28

## 2019-04-03 ENCOUNTER — OFFICE VISIT (OUTPATIENT)
Dept: UROLOGY | Facility: CLINIC | Age: 24
End: 2019-04-03
Attending: OBSTETRICS & GYNECOLOGY
Payer: COMMERCIAL

## 2019-04-03 VITALS — HEIGHT: 63 IN | BODY MASS INDEX: 19.42 KG/M2 | WEIGHT: 109.6 LBS

## 2019-04-03 DIAGNOSIS — N39.0 URINARY TRACT INFECTION WITHOUT HEMATURIA, SITE UNSPECIFIED: Primary | ICD-10-CM

## 2019-04-03 RX ORDER — NITROFURANTOIN 25; 75 MG/1; MG/1
100 CAPSULE ORAL 2 TIMES DAILY
Qty: 20 CAPSULE | Refills: 1 | Status: SHIPPED | OUTPATIENT
Start: 2019-04-03 | End: 2023-08-09

## 2019-04-03 ASSESSMENT — MIFFLIN-ST. JEOR: SCORE: 1203.65

## 2019-04-03 ASSESSMENT — PAIN SCALES - GENERAL: PAINLEVEL: NO PAIN (0)

## 2019-04-03 NOTE — NURSING NOTE
Chief Complaint   Patient presents with     RECHECK     finsihed antibiotics two weeks ago   finished three month antibiotics   Has not had any signs or symptoms of uti  Since last visit.   Just finished antibiotic two weeks ago.

## 2019-04-03 NOTE — LETTER
"4/3/2019       RE: Nereyda Carr  [address] [facility] [address]     Dear Colleague,    Thank you for referring your patient, Nereyda Carr, to the [facility] at [facility]. Please see a copy of my visit note below.    April 3, 2019    Return visit    Patient returns today for f/u of chronic UTI. Since her last visit she has completed 3 months of prophyaxis without UTI. Concerned about getting a UTI if she has intercourse.    Ht 1.588 m (5' 2.52\")   Wt 49.7 kg (109 lb 9.6 oz)   LMP 03/31/2019   BMI 19.71 kg/m     She is comfortable, in no distress, non-labored breathing.      A/P: 23 year old F with chronic UTIs    Rx for macrobid 1 po before and 12 hours after intercourse, #10, 1RF    A total of 15 minutes were spent with the patient today, > 50% in counseling and coordination of care    Marcello Morse MD  Professor, OB/GYN  Urogynecologist    CC  No care team member to display  SELF, REFERRED      Again, thank you for allowing me to participate in the care of your patient.      Sincerely,    Marcello Morse MD      "

## 2019-04-03 NOTE — LETTER
Date:April 4, 2019      Patient was self referred, no letter generated. Do not send.        [facility] Health Information

## 2019-04-22 ENCOUNTER — TELEPHONE (OUTPATIENT)
Dept: OBGYN | Facility: CLINIC | Age: 24
End: 2019-04-22

## 2019-04-22 DIAGNOSIS — R30.0 DYSURIA: Primary | ICD-10-CM

## 2019-04-22 DIAGNOSIS — R30.0 DYSURIA: ICD-10-CM

## 2019-04-22 LAB
ALBUMIN UR-MCNC: NEGATIVE MG/DL
APPEARANCE UR: CLEAR
BACTERIA #/AREA URNS HPF: ABNORMAL /HPF
BILIRUB UR QL STRIP: NEGATIVE
COLOR UR AUTO: YELLOW
GLUCOSE UR STRIP-MCNC: NEGATIVE MG/DL
HGB UR QL STRIP: ABNORMAL
KETONES UR STRIP-MCNC: NEGATIVE MG/DL
LEUKOCYTE ESTERASE UR QL STRIP: ABNORMAL
MUCOUS THREADS #/AREA URNS LPF: PRESENT /LPF
NITRATE UR QL: NEGATIVE
PH UR STRIP: 7 PH (ref 5–7)
RBC #/AREA URNS AUTO: 3 /HPF (ref 0–2)
SOURCE: ABNORMAL
SP GR UR STRIP: 1.01 (ref 1–1.03)
SQUAMOUS #/AREA URNS AUTO: 1 /HPF (ref 0–1)
UROBILINOGEN UR STRIP-MCNC: 0 MG/DL (ref 0–2)
WBC #/AREA URNS AUTO: 13 /HPF (ref 0–5)

## 2019-04-22 NOTE — TELEPHONE ENCOUNTER
Received message on triage voicemail from Nereyda stating she has a UTI and is wondering if Dr. Morse wants her to leave a urine sample before starting antibiotics.    Tried to reach Nereyda but received voicemail.  Left message to call back to discuss.

## 2019-04-22 NOTE — TELEPHONE ENCOUNTER
Discussed Nereyda's symptoms with Dr. Morse. He would like her to leave a UA/UC before starting the macrobid. Orders input. Left message for patient to call back.

## 2019-04-23 ENCOUNTER — MYC MEDICAL ADVICE (OUTPATIENT)
Dept: UROLOGY | Facility: CLINIC | Age: 24
End: 2019-04-23

## 2019-04-23 ENCOUNTER — TELEPHONE (OUTPATIENT)
Dept: OBGYN | Facility: CLINIC | Age: 24
End: 2019-04-23

## 2019-04-23 DIAGNOSIS — N39.0 UTI (URINARY TRACT INFECTION): Primary | ICD-10-CM

## 2019-04-23 LAB
BACTERIA SPEC CULT: ABNORMAL
Lab: ABNORMAL
SPECIMEN SOURCE: ABNORMAL

## 2019-04-23 NOTE — TELEPHONE ENCOUNTER
Pt called for culture results. Urine culture still pending. Informed patient if she does not hear from us by end of day can call for results.. Advised can take macrobid as previously discussed (she began after she left urine) until culture and sensitivities returns.      Patient agrees with plan and has no further questions at this time.    Routing to triage to check results

## 2019-04-23 NOTE — TELEPHONE ENCOUNTER
Tried to reach Nereyda but received voicemail.  Left message to call back to discuss result as unsure if voicemail is confidential.    Forwarding to Dr. Morse to advise if he wants to order sensitivities.

## 2019-04-23 NOTE — TELEPHONE ENCOUNTER
Informed Nereyda we haven't heard back from Dr Morse about running sensitivity for UC but he will be in clinic tomorrow. Nereyda will call back tomorrow for plan if she doesn't hear from Dr Morse. She reports starting antibiotic prescribed to use post coital.

## 2019-04-24 RX ORDER — AMOXICILLIN AND CLAVULANATE POTASSIUM 500; 125 MG/1; MG/1
1 TABLET, FILM COATED ORAL 2 TIMES DAILY
Qty: 20 TABLET | Refills: 0 | Status: SHIPPED | OUTPATIENT
Start: 2019-04-24 | End: 2019-05-04

## 2019-04-24 NOTE — TELEPHONE ENCOUNTER
Received note back from Dr. Morse that he does not want sensitivities done for UTI result and placed new order for augmentin 250/500. This strength of augmentin is not available. Received telephone order to change augmentin to 500-125 BID [id number]. Order entered.  Patient informed via SMARTt.

## 2019-05-23 ENCOUNTER — COMMUNICATION - HEALTHEAST (OUTPATIENT)
Dept: INTERNAL MEDICINE | Facility: CLINIC | Age: 24
End: 2019-05-23

## 2019-06-21 ENCOUNTER — COMMUNICATION - HEALTHEAST (OUTPATIENT)
Dept: INTERNAL MEDICINE | Facility: CLINIC | Age: 24
End: 2019-06-21

## 2019-07-18 ENCOUNTER — COMMUNICATION - HEALTHEAST (OUTPATIENT)
Dept: INTERNAL MEDICINE | Facility: CLINIC | Age: 24
End: 2019-07-18

## 2019-08-14 ENCOUNTER — COMMUNICATION - HEALTHEAST (OUTPATIENT)
Dept: INTERNAL MEDICINE | Facility: CLINIC | Age: 24
End: 2019-08-14

## 2019-09-05 ENCOUNTER — COMMUNICATION - HEALTHEAST (OUTPATIENT)
Dept: INTERNAL MEDICINE | Facility: CLINIC | Age: 24
End: 2019-09-05

## 2019-10-07 ENCOUNTER — COMMUNICATION - HEALTHEAST (OUTPATIENT)
Dept: INTERNAL MEDICINE | Facility: CLINIC | Age: 24
End: 2019-10-07

## 2019-11-13 ENCOUNTER — COMMUNICATION - HEALTHEAST (OUTPATIENT)
Dept: INTERNAL MEDICINE | Facility: CLINIC | Age: 24
End: 2019-11-13

## 2020-03-11 ENCOUNTER — HEALTH MAINTENANCE LETTER (OUTPATIENT)
Age: 25
End: 2020-03-11

## 2021-01-03 ENCOUNTER — HEALTH MAINTENANCE LETTER (OUTPATIENT)
Age: 26
End: 2021-01-03

## 2021-04-25 ENCOUNTER — HEALTH MAINTENANCE LETTER (OUTPATIENT)
Age: 26
End: 2021-04-25

## 2021-05-25 ENCOUNTER — RECORDS - HEALTHEAST (OUTPATIENT)
Dept: ADMINISTRATIVE | Facility: CLINIC | Age: 26
End: 2021-05-25

## 2021-05-31 VITALS — HEIGHT: 63 IN | WEIGHT: 110 LBS | BODY MASS INDEX: 19.49 KG/M2

## 2021-05-31 VITALS — BODY MASS INDEX: 19.68 KG/M2 | WEIGHT: 111.13 LBS

## 2021-06-01 VITALS — BODY MASS INDEX: 20.15 KG/M2 | WEIGHT: 110.19 LBS

## 2021-06-01 VITALS — WEIGHT: 110.1 LBS | BODY MASS INDEX: 20.14 KG/M2

## 2021-06-02 VITALS — BODY MASS INDEX: 20.12 KG/M2 | WEIGHT: 110 LBS

## 2021-06-16 NOTE — PROGRESS NOTES
ASSESSMENT:  1.  Exudative tonsillitis: Quick strep test returned negative.  Since she has both anterior and posterior cervical adenopathy, she will be checked for mononucleosis.  One dose steroid therapy will be given for symptomatic treatment of sore throat    PLAN:  1.  Quick strep test returned negative  2.  Mono screen  3.  Prednisone 40 mg 1 dose for sore throat  4.  Supportive measures  5.  She should remain off work until symptoms significantly improve.  6.  Call if no improvement in 1 week  Addendum: Monospot returned positive consistent with acute infectious mononucleosis    Orders Placed This Encounter   Procedures     Rapid Strep A Screen-Throat     Group A Strep, RNA Direct Detection, Throat     Mononucleosis Screen     There are no discontinued medications.    No Follow-up on file.    ASSESSED PROBLEMS:  1. Tonsillitis with exudate  Rapid Strep A Screen-Throat    Mononucleosis Screen    Group A Strep, RNA Direct Detection, Throat    predniSONE (DELTASONE) 20 MG tablet       CHIEF COMPLAINT:  Chief Complaint   Patient presents with     Ear Pain     dizziness, fever, nausea x 3 days       HISTORY OF PRESENT ILLNESS:  Nereyda is a 22 y.o. female presenting to the clinic today with concerns of ear pain.    Tonsillitis: She endorses ear pain, sore throat, and body aches that started Friday and continued through the weekend. Her symptoms were starting to resolve, but she woke up with night sweats last night. She woke up this morning with some nausea, ear pain, fever, and dizziness. The discomfort in her ears is bilateral, but more on the right. She denies a cough or appetite changes. She has not been around anyone with Strep throat. Her throat causes her discomfort when she swallows, but it is uncomfortable at other times as well. She has been drinking hot liquids and Pedialyte.     REVIEW OF SYSTEMS:   All other systems are negative.    PFSH:  Reviewed, as below.    TOBACCO USE:  History   Smoking Status      Never Smoker   Smokeless Tobacco     Never Used       VITALS:  Vitals:    02/19/18 1451   BP: 106/64   Patient Site: Left Arm   Patient Position: Sitting   Cuff Size: Adult Regular   Pulse: 80   Weight: 111 lb 2 oz (50.4 kg)     Wt Readings from Last 3 Encounters:   02/19/18 111 lb 2 oz (50.4 kg)   09/21/16 107 lb 9 oz (48.8 kg)     Body mass index is 19.68 kg/(m^2).    PHYSICAL EXAM:  Constitutional:   Reveals an alert, pleasant, slender young woman. Affect appropriate. Does not appear acutely ill.  Vitals: per nursing notes.  HEENT: Atraumatic. Ears:  External canals, TMs clear.    Eyes:  EOMs full, PERRL.  Oropharynx:  Posterior pharyngeal exudate and tonsillitis, left side greater than right  Neck:  Tender anterior cervical adenopathy, posterior cervical tenderness.  Lungs: Clear to A&P without rales or wheezes.  Respiratory effort normal.  Cardiac:   Regular rate and rhythm, normal S1, S2, no murmur or gallop.  Neuro:  Alert and oriented.  No gross focal deficits.  Psychiatric:  Memory intact, mood appropriate.    Rapid Strep: Negative    ADDITIONAL HISTORY SUMMARIZED (2): None.  DECISION TO OBTAIN EXTRA INFORMATION (1): None.   RADIOLOGY TESTS (1): None.  LABS (1): Rapid Strep test and mono labs ordered.  MEDICINE TESTS (1): None.  INDEPENDENT REVIEW (2 each): None.     The visit lasted a total of 16 minutes face to face with the patient. Over 50% of the time was spent counseling and educating the patient about recent ear pain, sore throat, fever, and dizziness.    ISylvia, am scribing for and in the presence of, Dr. Worley.    IChristian, personally performed the services described in this documentation, as scribed by Sylvia Linn in my presence, and it is both accurate and complete.    Dragon dictation was used for this note.  Speech recognition errors are a possibility.    MEDICATIONS:  Current Outpatient Prescriptions   Medication Sig Dispense Refill     FLUoxetine (PROZAC) 20 MG tablet  TAKE ONE TABLET BY MOUTH DAILY 90 tablet 3     norgestimate-ethinyl estradiol (ORTHO-CYCLEN) 0.25-35 mg-mcg per tablet Take 1 tablet by mouth daily.       predniSONE (DELTASONE) 20 MG tablet Two tabs once 2 tablet 0     No current facility-administered medications for this visit.        Total data points: 1

## 2021-06-16 NOTE — PROGRESS NOTES
Office Visit - Follow Up   Nereyda Carr   22 y.o. female    Date of Visit: 2/28/2018    Chief Complaint   Patient presents with     Sore Throat     Last Mon was dx with Mono by Dr. Worley.  Last night throat was more sore. Takes Ibuprofen with no relief today.        Assessment and Plan   1. Infectious mononucleosis  Seen by Dr. Armstrong on 2/22/2018  for body aches, earache and sore throat.  Had rapid strep which was negative.  But  was positive for Monospot consistent with infectious mononucleosis.  Treated with 1 dose of prednisone 40 mg.  Had significant improvement of her sore throat.  But in the last few days had recurrence of her sore throat.  Exam of the throat showed some congestion  but no tonsillar enlargement and presence of exudates..  Since she responded to single dose of prednisone will retreat her with prednisone 10 mg 1 tablet daily for 5 days.  - predniSONE (DELTASONE) 10 mg tablet; Take 1 tablet (10 mg total) by mouth daily.  Dispense: 5 tablet; Refill: 0    Advised to see Dr. Armstrong for follow-up if her symptoms persist.  Discussed pathophysiology of infectious mono.  Informed this is caused by a viral infection, EBV.  No treatment is indicated for resolution of mononucleosis.  Oral steroid treatment is for her sore throat due to inflammation.      Follow up as needed.     History of Present Illness   This 22 y.o. old female patient of Dr. Armstrong complains of recurrence of her sore throat.  Was found to have an infectious mononucleosis on 2/22/2018.  Was treated with 1 dose of prednisone.  Had improvement of her sore throat and body aches.  But in the last few days had recurrence of her sore throat. Reports it is painful to swallow.  Does not have body aches and earaches anymore.  Denies fever.  Came to see me for her sore throat.    Review of Systems   A 12 point comprehensive review of systems was negative except as noted..     Medications, Allergies and Problem List   Reviewed and  "updated             Chief Complaint   Sore Throat (Last Mon was dx with Mono by Dr. Worley.  Last night throat was more sore. Takes Ibuprofen with no relief today.)       Patient Profile   Social History     Social History Narrative        Past Medical History   Patient Active Problem List   Diagnosis     Contact Dermatitis     Acne     Abnormal Weight Loss     Major Depression, Recurrent       Past Surgical History  She has a past surgical history that includes pr dental surgery procedure.       Medications and Allergies   Current Outpatient Prescriptions   Medication Sig     FLUoxetine (PROZAC) 20 MG tablet TAKE ONE TABLET BY MOUTH DAILY     norgestimate-ethinyl estradiol (ORTHO-CYCLEN) 0.25-35 mg-mcg per tablet Take 1 tablet by mouth daily.     predniSONE (DELTASONE) 10 mg tablet Take 1 tablet (10 mg total) by mouth daily.     No Known Allergies     Family and Social History   Family History   Problem Relation Age of Onset     No Medical Problems Mother      No Medical Problems Father         Social History   Substance Use Topics     Smoking status: Never Smoker     Smokeless tobacco: Never Used     Alcohol use Yes      Comment: occasional        Physical Exam       Physical Exam  /70  Pulse 70  Temp 98.8  F (37.1  C) (Oral)   Ht 5' 3\" (1.6 m)  Wt 110 lb (49.9 kg)  LMP 02/12/2018 (Exact Date)  SpO2 99%  Breastfeeding? No  BMI 19.49 kg/m2  General appearance: alert, appears stated age, cooperative and no distress  Head: Normocephalic, without obvious abnormality, atraumatic  Throat: lips, mucosa, and tongue normal; teeth and gums normal and congested  throat but there are no  tonsillar enlargement and presence of exudate  Neck: no adenopathy, no carotid bruit, no JVD, supple, symmetrical, trachea midline and thyroid not enlarged, symmetric, no tenderness/mass/nodules  Lungs: clear to auscultation bilaterally  Heart: regular rate and rhythm, S1, S2 normal, no murmur, click, rub or gallop  Abdomen: " soft, non-tender; bowel sounds normal; no masses,  no organomegaly  Extremities: extremities normal, atraumatic, no cyanosis or edema  Skin: Skin color, texture, turgor normal. No rashes or lesions     Additional Information        Erick Camp MD  Internal Medicine  Contact me at [phone]     Additional Information   Current Outpatient Prescriptions   Medication Sig     FLUoxetine (PROZAC) 20 MG tablet TAKE ONE TABLET BY MOUTH DAILY     norgestimate-ethinyl estradiol (ORTHO-CYCLEN) 0.25-35 mg-mcg per tablet Take 1 tablet by mouth daily.     predniSONE (DELTASONE) 10 mg tablet Take 1 tablet (10 mg total) by mouth daily.     No Known Allergies  Social History   Substance Use Topics     Smoking status: Never Smoker     Smokeless tobacco: Never Used     Alcohol use Yes      Comment: occasional         Time: total time spent with the patient was 25 minutes of which >50% was spent in counseling and coordination of care

## 2021-06-18 NOTE — PROGRESS NOTES
ASSESSMENT:  1.  Travel precautions: Nereyda is going to [place] on vacation in July.  She will be visiting forested areas.  Coverage for malaria would be advised.  Other preventative measures were reviewed    PLAN:  1.  Hepatitis A immunization  2.  Malarone for malaria prophylaxis  3.  Cipro 500 mg twice daily for 5 days if needed for UTI or traveler's diarrhea.  Also bring Imodium AD to use for symptomatic treatment.  4.  Skin care and protection from insects was reviewed.  5.  See for yearly exam or as needed    Orders Placed This Encounter   Procedures     Hepatitis A adult 2 dose IM (19 yr & older)     There are no discontinued medications.        ASSESSED PROBLEMS:  1. Need for malaria prophylaxis  atovaquone-proguanil (MALARONE) 250-100 mg Tab   2. Traveler's diarrhea  ciprofloxacin HCl (CIPRO) 500 MG tablet   3. Hepatitis A immunoglobulin immunization  Hepatitis A adult 2 dose IM (19 yr & older)       CHIEF COMPLAINT:  Chief Complaint   Patient presents with     Travel Consult     6/29/18 traveling to [place]       HISTORY OF PRESENT ILLNESS:  Nereyda is a 22 y.o. female presenting to the clinic today for a travel consult.     Travel Consult: She will be traveling to [facility] this summer and would like to know what vaccinations or medications she may need for the trip. She will be leaving on 6/29 and plans to be in [facility] for a full week with some travel days outside of that. She plans to do a lot of hiking during the trip and wonders if she might need a malaria prophylaxis. She inquires if her mononucleosis or kidney infections from earlier this year are any cause for concern in regard to her travel plans.     REVIEW OF SYSTEMS:   She is not pregnant. All other systems are negative.    PFSH:  Reviewed, as below.     TOBACCO USE:  History   Smoking Status     Never Smoker   Smokeless Tobacco     Never Used       VITALS:  Vitals:    06/15/18 1544   BP: 98/58   Pulse: 67   Temp: 98  F (36.7   C)   TempSrc: Temporal   SpO2: 99%   Weight: 110 lb 1.6 oz (49.9 kg)     Wt Readings from Last 3 Encounters:   06/15/18 110 lb 1.6 oz (49.9 kg)   06/03/18 110 lb (49.9 kg)   02/28/18 110 lb (49.9 kg)     Body mass index is 20.14 kg/(m^2).    PHYSICAL EXAM:  Constitutional:   Reveals an alert, pleasant, slender, healthy appearing female. Affect appropriate. Does not appear acutely ill.  Vitals: per nursing notes.  Psychiatric:  Memory intact, mood appropriate.  Detailed exam not done.     ADDITIONAL HISTORY SUMMARIZED (2): None.  DECISION TO OBTAIN EXTRA INFORMATION (1): None.    RADIOLOGY TESTS (1): None.  LABS (1): Labs from 6/3/2018 reviewed.   MEDICINE TESTS (1): None.  INDEPENDENT REVIEW (2 each): None.     The visit lasted a total of 13 minutes face to face with the patient. Over 50% of the time was spent counseling and educating the patient about her upcoming travel.    IAlonso, am scribing for and in the presence of, Dr. Worley.    IChristian, personally performed the services described in this documentation, as scribed by Alonso Grimm in my presence, and it is both accurate and complete.    Dragon dictation was used for this note.  Speech recognition errors are a possibility.    MEDICATIONS:  Current Outpatient Prescriptions   Medication Sig Dispense Refill     FLUoxetine (PROZAC) 20 MG tablet Take 1 tablet (20 mg total) by mouth daily. 90 tablet 2     norgestimate-ethinyl estradiol (ORTHO-CYCLEN) 0.25-35 mg-mcg per tablet Take 1 tablet by mouth daily.       tretinoin (RETIN-A) 0.1 % cream Apply 1 application topically at bedtime.       atovaquone-proguanil (MALARONE) 250-100 mg Tab Take 1 tablet by mouth daily with breakfast. 15 tablet 0     ciprofloxacin HCl (CIPRO) 500 MG tablet Take 1 tablet (500 mg total) by mouth 2 (two) times a day for 10 doses. 10 tablet 0     No current facility-administered medications for this visit.        Total data points: 1

## 2021-06-19 NOTE — PROGRESS NOTES
[facility] Clinic Follow Up Note    Nereyda Carr   22 y.o. female    Date of Visit: 7/16/2018    Chief Complaint   Patient presents with     Follow-up     recurring urinary symptoms     Subjective  Nereyda comes in today as she has had episodes of a UTI frequently recently.  She had what was thought to be pyelonephritis last month but the urine culture was negative.  She also was recently in [place] and had a recurrent UTI and took Cipro on her own and she did improve.  She now again feels as though she has a UTI.  She thinks it could be related to sexual intercourse.  She is having some lower abdominal pain.    ROS A comprehensive review of systems was performed and was otherwise negative.    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       Medications were reconciled.  Allergies, social and family history, and the problem list were all reviewed and updated.    Old records reviewed: ER records, previous urine cultures    Exam  General Appearance: Pleasant and alert   Vitals:    07/16/18 0922   BP: 96/64   Patient Site: Left Arm   Patient Position: Sitting   Cuff Size: Adult Regular   Pulse: (!) 50   SpO2: 100%   Weight: 110 lb 3 oz (50 kg)      Body mass index is 20.15 kg/(m^2).  Wt Readings from Last 3 Encounters:   07/16/18 110 lb 3 oz (50 kg)   06/15/18 110 lb 1.6 oz (49.9 kg)   06/03/18 110 lb (49.9 kg)     HEENT: Sclera are clear.   Lungs: Normal respirations  Cardiac: Regular rate and rhythm   Abdomen: Soft and nondistended  Extremities: No edema  Skin: No rashes  Neuro: Moves all extremities and has facial symmetry  Gait: Ambulates with a normal gait    Assessment/Plan  1. Urine frequency  Urinalysis is positive.  Due to frequent nature of her UTIs will do a CT scan to rule out a duplicating system or other abnormality.  Also have her see urology.  May benefit from prophylactic treatment after intercourse.  Treat this one with Bactrim  and await for culture results.  - Urinalysis-UC if Indicated  - Culture, Urine  - CT Abdomen Without Oral With and Without IV Contrast; Future  - Ambulatory referral to Urology    2. Frequent UTI  As above.  - CT Abdomen Without Oral With and Without IV Contrast; Future  - Ambulatory referral to Urology          Ewa Black MD  Internal and Geriatric Medicine  [facility]    Much or all of the text in this note was generated through the use of Dragon Dictate voice-to-text software. Errors in spelling or words which seem out of context are unintentional. Sound alike errors, in particular, may have escaped editing.

## 2021-06-19 NOTE — LETTER
Letter by Christian Worley MD at      Author: Christian Worley MD Service: -- Author Type: --    Filed:  Encounter Date: 11/13/2019 Status: Signed       Nereyda Carr  [address] [facility] [address] [facility] [address]    November 13, 2019    Dear Nereyda    In reviewing your records, we have determined a gap in your preventive services. Based on your age and health history, we recommend the follow service.     ? General Physical  ? Physical with a Pap Smear  ? Colon cancer screening  ? Mammogram  ? Immunization  ? Diabetic check  ? Blood pressure/cardiovascular check  ? Asthma check  ? Cholesterol test  ? Lab work  ? Med check    If you have had the service elsewhere, please contact us so we can update our records. Please let us know if you have transferred your care to another clinic.    Please call [phone] to schedule this appointment.    We believe that a strong preventive care program, including regular physicals and follow-up care is an important part of a healthy lifestyle and we are committed to helping you maintain your health.    Thank you for choosing us as your health care provider.    Sincerely,   [facility] INTERNAL MEDICINE  [address] [place] [address]  Phone Number:  [phone]

## 2021-06-22 NOTE — PROGRESS NOTES
1. Urinary tract infection without hematuria, site unspecified  Urinalysis-UC if Indicated    Culture, Urine        Medications Ordered   Medications     norgestimate-ethinyl estradiol (SPRINTEC, 28,) 0.25-35 mg-mcg per tablet     Sig: Take 1 tablet by mouth daily.     Dispense:  3 Package     Refill:  3     sulfamethoxazole-trimethoprim (BACTRIM DS) 800-160 mg per tablet     Sig: Take 1 tablet by mouth 2 (two) times a day for 7 days.     Dispense:  14 tablet     Refill:  0     sulfamethoxazole-trimethoprim (BACTRIM) 400-80 mg per tablet     Sig: Take 1 pill with sexual activity.     Dispense:  30 tablet     Refill:  2        Plan: We will give her some Bactrim double strength to use for treatment of this current UTI.  After that, we discussed postcoital prophylaxis with single strength Bactrim.  She is comfortable with this plan and she will use 1 pill after intercourse and see how that works.  Hopefully it will decrease or eliminate her urinary tract infections as most of them seem to be postcoital.  We can follow-up with her as needed after.  If she is still having some trouble with this we can consider daily prophylaxis but we will try the postcoital route first.    Subjective: 23-year-old female who is here today with another urinary tract infection.  She has had many of these in the last 8 months.  She states that she has switched partners 8 months ago, and since then it seems to be an issue.  They do not use condoms.  She is on the birth control pill.  They do get clean before and the patient tries to urinate after.  She is also taking cranberry extract.  She is trying to hydrate more.  Frustrated because of the frequency and has never actually discussed prophylaxis in the past.    Objective: Well-appearing female in no acute distress.  Vital signs as noted.  Chest clear to auscultation.  Heart regular rate and rhythm.  Abdomen is soft nontender nondistended bowel sounds present in all quadrants.  Urine as  below.      Recent Results (from the past 24 hour(s))   Urinalysis-UC if Indicated   Result Value Ref Range    Color, UA Yellow Colorless, Yellow, Straw, Light Yellow    Clarity, UA Clear Clear    Glucose, UA Negative Negative    Bilirubin, UA Negative Negative    Ketones, UA Negative Negative    Specific Gravity, UA 1.010 1.005 - 1.030    Blood, UA Moderate (!) Negative    pH, UA 6.0 5.0 - 8.0    Protein, UA Negative Negative mg/dL    Urobilinogen, UA 0.2 E.U./dL 0.2 E.U./dL, 1.0 E.U./dL    Nitrite, UA Negative Negative    Leukocytes, UA Small (!) Negative    Bacteria, UA Moderate (!) None Seen hpf    RBC, UA 0-2 None Seen, 0-2 hpf    WBC, UA 0-5 None Seen, 0-5 hpf    Squam Epithel, UA 0-5 None Seen, 0-5 lpf

## 2021-08-14 ENCOUNTER — NURSE TRIAGE (OUTPATIENT)
Dept: NURSING | Facility: CLINIC | Age: 26
End: 2021-08-14

## 2021-08-14 NOTE — TELEPHONE ENCOUNTER
"Pt reports \"uti symptoms\" started today. Pt reports Urgency, Dysuria with 4-5/10 on pain scale. Increased urinary frequency. Pt denies blood in urine, or back or flank/side pain. Has not taken temperature, no thermometer. Pt reports she had UTI about 6 weeks ago that was treated and completely resolved.    Per RN Triage protocol, pt will be seen in clinic/urgent care within 24 hours. Caller given care advice per RN triage protocol. Caller verbalizes understanding and agreement of plan.    COVID 19 Nurse Triage Plan/Patient Instructions    Please be aware that novel coronavirus (COVID-19) may be circulating in the community. If you develop symptoms such as fever, cough, or SOB or if you have concerns about the presence of another infection including coronavirus (COVID-19), please contact your health care provider or visit [email or web address].     Disposition/Instructions    In-Person Visit with provider recommended. Reference Visit Selection Guide.    Thank you for taking steps to prevent the spread of this virus.  o Limit your contact with others.  o Wear a simple mask to cover your cough.  o Wash your hands well and often.    Resources    [facility]: About COVID-19: [email or web address]    CDC: What to Do If You're Sick: www.cdc.gov/coronavirus/2019-ncov/about/steps-when-sick.html    CDC: Ending Home Isolation: www.cdc.gov/coronavirus/2019-ncov/hcp/disposition-in-home-patients.html     CDC: Caring for Someone: www.cdc.gov/coronavirus/2019-ncov/if-you-are-sick/care-for-someone.html     [facility]: Interim Guidance for Hospital Discharge to Home: www.health.[facility]/diseases/coronavirus/hcp/hospdischarge.pdf    [facility] clinical trials (COVID-19 research studies): [email or web address]     Below are the COVID-19 hotlines at the [place] Department of [facility] ([facility]). Interpreters are available.   o For health questions: Call 800-161-1431 or 1-535.631.1240 (7 " a.m. to 7 p.m.)  o For questions about schools and childcare: Call 068-380-3385 or 1-853.421.1363 (7 a.m. to 7 p.m.)                        Reason for Disposition    All other patients with painful urination(Exception: [1] EITHER frequency or urgency AND [2] has on-call doctor)    Additional Information    Negative: Shock suspected (e.g., cold/pale/clammy skin, too weak to stand, low BP, rapid pulse)    Negative: Sounds like a life-threatening emergency to the triager    Negative: Followed a genital area injury    Negative: Taking antibiotic for urinary tract infection (UTI)    Negative: Pregnant    Negative: Postpartum (from 0 to 6 weeks after delivery)    Negative: [1] Unable to urinate (or only a few drops) > 4 hours AND [2] bladder feels very full (e.g., palpable bladder or strong urge to urinate)    Negative: Vomiting    Negative: Patient sounds very sick or weak to the triager    Negative: Side (flank) or lower back pain present    Negative: Fever > 100.4 F (38.0 C)    Negative: [1] SEVERE pain with urination  (e.g., excruciating) AND [2] not improved after 2 hours of pain medicine and Sitz bath    Negative: Diabetes mellitus or weak immune system (e.g., HIV positive, cancer chemo, splenectomy, organ transplant, chronic steroids)    Negative: Bedridden (e.g., nursing home patient, CVA, chronic illness, recovering from surgery)    Negative: Artificial heart valve or artificial joint    Negative: Unusual vaginal discharge (e.g., bad smelling, yellow, green, or foamy-white)    Negative: Patient is worried about sexually transmitted disease (STD)    Negative: Possibility of pregnancy    Negative: Blood in urine (red, pink, or tea-colored)    Negative: Age > 50 years    Negative: > 2 UTI's in last year    Protocols used: URINATION PAIN - FEMALE-A-

## 2021-10-10 ENCOUNTER — HEALTH MAINTENANCE LETTER (OUTPATIENT)
Age: 26
End: 2021-10-10

## 2022-05-21 ENCOUNTER — HEALTH MAINTENANCE LETTER (OUTPATIENT)
Age: 27
End: 2022-05-21

## 2022-09-18 ENCOUNTER — HEALTH MAINTENANCE LETTER (OUTPATIENT)
Age: 27
End: 2022-09-18

## 2022-12-08 NOTE — TELEPHONE ENCOUNTER
Patient continues to complain of pain to abdomen, medicated with norco and ativan. Increased diet to full liquid low fiber.    Spoke to Dr Morse in clinic and he will call Nereyda back to discuss antibiotics.

## 2023-06-04 ENCOUNTER — HEALTH MAINTENANCE LETTER (OUTPATIENT)
Age: 28
End: 2023-06-04

## 2023-08-08 PROBLEM — N39.0 CHRONIC UTI: Status: ACTIVE | Noted: 2018-11-01

## 2023-08-08 RX ORDER — FEXOFENADINE HCL 180 MG/1
180 TABLET ORAL
COMMUNITY

## 2023-08-08 NOTE — PATIENT INSTRUCTIONS
Chronic constipation:               --Drink 3-4 big glasses water a day.              --Start a fiber supplement every day (metamucil or benefiber).              --Diet goal is 25-30 gm of fiber a day.              --Try to do daily exercise - even 15 minutes makes a big difference.              --Start miralax daily.    --avoiding lactose, dairy, gluten   --you can take imodium prior to a meal or travel; or on an as needed basis    Schedule physical this year.      Dr. Campos

## 2023-08-09 ENCOUNTER — OFFICE VISIT (OUTPATIENT)
Dept: FAMILY MEDICINE | Facility: CLINIC | Age: 28
End: 2023-08-09
Payer: COMMERCIAL

## 2023-08-09 VITALS
WEIGHT: 112.09 LBS | TEMPERATURE: 97.6 F | RESPIRATION RATE: 18 BRPM | HEIGHT: 63 IN | DIASTOLIC BLOOD PRESSURE: 67 MMHG | BODY MASS INDEX: 19.86 KG/M2 | SYSTOLIC BLOOD PRESSURE: 105 MMHG | HEART RATE: 62 BPM | OXYGEN SATURATION: 100 %

## 2023-08-09 DIAGNOSIS — Z97.5 IUD (INTRAUTERINE DEVICE) IN PLACE: ICD-10-CM

## 2023-08-09 DIAGNOSIS — Z76.89 ENCOUNTER TO ESTABLISH CARE: Primary | ICD-10-CM

## 2023-08-09 DIAGNOSIS — Z87.440 HISTORY OF CHRONIC URINARY TRACT INFECTION: ICD-10-CM

## 2023-08-09 DIAGNOSIS — K58.2 IRRITABLE BOWEL SYNDROME WITH BOTH CONSTIPATION AND DIARRHEA: ICD-10-CM

## 2023-08-09 PROCEDURE — 99203 OFFICE O/P NEW LOW 30 MIN: CPT | Performed by: STUDENT IN AN ORGANIZED HEALTH CARE EDUCATION/TRAINING PROGRAM

## 2023-08-09 ASSESSMENT — PATIENT HEALTH QUESTIONNAIRE - PHQ9
10. IF YOU CHECKED OFF ANY PROBLEMS, HOW DIFFICULT HAVE THESE PROBLEMS MADE IT FOR YOU TO DO YOUR WORK, TAKE CARE OF THINGS AT HOME, OR GET ALONG WITH OTHER PEOPLE: NOT DIFFICULT AT ALL
SUM OF ALL RESPONSES TO PHQ QUESTIONS 1-9: 2
SUM OF ALL RESPONSES TO PHQ QUESTIONS 1-9: 2

## 2023-08-09 ASSESSMENT — PAIN SCALES - GENERAL: PAINLEVEL: NO PAIN (0)

## 2023-08-09 NOTE — PROGRESS NOTES
Assessment & Plan     Encounter to establish care  IUD in place  Reviewed medical history, PSH, PFH. Pap UTD. Happy with IUD. Due for physical.    Irritable bowel syndrome with both constipation and diarrhea  Chronic constipation. Notices diarrhea & increased frequency of BM's with travel/stress. Mother has UC. Pt denies blood in stool, weight loss, fever/chills, or other concerning symptoms so I think it is unlikely she has UC currently. Symptoms sound consistent with IBS.    Plan:  -miralax daily  -increase fiber intake with food or supplement  -drink plenty of water, exercise routinely  -take imodium PRN for diarrhea or prior to travel  -limit gluten, dairy, sugar  -follow up if no improvement-could consider GI referral at that time    History of chronic UTI  No issues in awhile. Takes d-mannose after sex, good w/personal hygiene.      Santosh Campos DO  [facility]    Ernesto Dawn is a 27 year old, presenting for the following health issues:  Establish Care and Gastrointestinal Problem        8/9/2023     2:53 PM   Additional Questions   Roomed by Shazia       History of Present Illness       Reason for visit:  GI and IBS concerns  Symptom onset:  More than a month  Symptoms include:  Diarrhea, nausea, stomachache, loss of appetite  Symptom intensity:  Moderate  Symptom progression:  Improving  Had these symptoms before:  Yes  Has tried/received treatment for these symptoms:  No    She eats 2-3 servings of fruits and vegetables daily.She consumes 0 sweetened beverage(s) daily.She exercises with enough effort to increase her heart rate 20 to 29 minutes per day.  She exercises with enough effort to increase her heart rate 5 days per week.   She is taking medications regularly.       Establish care    GI issues/IBS  -today fine  -recurring over past few months, off/on  -started 4/2023-traveling, noticed increased BM's (no diarrhea)  -happened again 1-2 months later but with  "vomiting, diarrhea, and dec appetite  -last was 1 week after this 2nd episode  -last episode July 9th-11th   -concerned for IBS (usually more constipation)  -mother had ulcerative colitis  -denies: blood in stool, some intermittent ab pain, bloating, gas  -triggers: too much diary, sugar  -tried imodium, nothing helps with nausea    Chronic UTI  -d-mannose  -good with hygiene  -no issues in a while      Review of Systems   Constitutional, HEENT, cardiovascular, pulmonary, gi and gu systems are negative, except as otherwise noted.      Objective    /67 (BP Location: Right arm, Patient Position: Sitting, Cuff Size: Adult Regular)   Pulse 62   Temp 97.6  F (36.4  C) (Temporal)   Resp 18   Ht 1.6 m (5' 3\")   Wt 50.8 kg (112 lb 1.4 oz)   SpO2 100%   Breastfeeding No   BMI 19.86 kg/m    Body mass index is 19.86 kg/m .    Physical Exam  Constitutional:       General: She is not in acute distress.     Appearance: She is not ill-appearing.   Eyes:      Extraocular Movements: Extraocular movements intact.      Conjunctiva/sclera: Conjunctivae normal.      Pupils: Pupils are equal, round, and reactive to light.   Pulmonary:      Effort: Pulmonary effort is normal.   Abdominal:      General: Abdomen is flat. There is no distension.   Neurological:      General: No focal deficit present.      Mental Status: She is alert and oriented to person, place, and time.                      "

## 2024-07-14 ENCOUNTER — HEALTH MAINTENANCE LETTER (OUTPATIENT)
Age: 29
End: 2024-07-14

## 2024-10-29 ENCOUNTER — PATIENT OUTREACH (OUTPATIENT)
Dept: SURGERY | Facility: CLINIC | Age: 29
End: 2024-10-29
Payer: COMMERCIAL

## 2024-10-29 NOTE — PROGRESS NOTES
"Patient is scheduled to consult with Dr. Rodgers on 11/21 regarding a thigh mass and \"reconstructive surgery\".  Uncertain of the reconstructive question as the patient is scheduled with General Surgery.  Attempted to reach the patient and discuss logistics of General Surgery appointment and if reconstructive services are being requested, patient will need to consult with Plastic Surgery rather than General Surgery.  LM on  to call office- direct dial provided.    Reason for Call: Other: Online appt request:\"cyst removal + reconstructive surgery on thigh\"   "

## 2024-10-29 NOTE — TELEPHONE ENCOUNTER
REFERRAL INFORMATION:  Referring Provider:    Referring Clinic:    Reason for Visit/Diagnosis: Cyst on right upper thigh,        FUTURE VISIT INFORMATION:  Appointment Date: 11/21/24  Appointment Time:      NOTES RECORD STATUS  DETAILS   PERTINENT LABS Care Everywhere      Records Requested    Facility    Fax:    Outcome

## 2024-10-31 ENCOUNTER — TELEPHONE (OUTPATIENT)
Dept: PLASTIC SURGERY | Facility: CLINIC | Age: 29
End: 2024-10-31
Payer: COMMERCIAL

## 2024-10-31 NOTE — TELEPHONE ENCOUNTER
Left Voicemail (1st Attempt) and Sent Mychart (1st Attempt) for the patient to call back and schedule the following:    Appointment type: New Plastic Surgery  Provider: /Dr. Orozco  Return date: can offer 11/14/2024 at 1030 am or 12/04/24 at 1100 am   Specialty phone number: [phone]  Additional appointment(s) needed: n/a  Additonal Notes: Pt requesting an appt in plastics in regards to  Cyst on right upper thigh

## 2024-11-05 ENCOUNTER — TELEPHONE (OUTPATIENT)
Dept: PLASTIC SURGERY | Facility: CLINIC | Age: 29
End: 2024-11-05
Payer: COMMERCIAL

## 2024-11-05 NOTE — TELEPHONE ENCOUNTER
Left Voicemail (2nd Attempt) and Sent Mychart (2nd Attempt) for the patient to call back and schedule the following:    Appointment type: New Plastic Surgery   Provider:   Return date: Next available   Specialty phone number: [phone]  Additional appointment(s) needed: n/a  Additonal Notes: right thigh mass/cyst.

## 2024-11-08 ENCOUNTER — TRANSFERRED RECORDS (OUTPATIENT)
Dept: HEALTH INFORMATION MANAGEMENT | Facility: CLINIC | Age: 29
End: 2024-11-08
Payer: COMMERCIAL

## 2024-11-21 ENCOUNTER — PRE VISIT (OUTPATIENT)
Dept: SURGERY | Facility: CLINIC | Age: 29
End: 2024-11-21

## 2025-06-17 ENCOUNTER — PATIENT OUTREACH (OUTPATIENT)
Dept: CARE COORDINATION | Facility: CLINIC | Age: 30
End: 2025-06-17
Payer: COMMERCIAL

## 2025-08-30 ENCOUNTER — HEALTH MAINTENANCE LETTER (OUTPATIENT)
Age: 30
End: 2025-08-30